# Patient Record
Sex: MALE | Race: ASIAN | NOT HISPANIC OR LATINO | Employment: FULL TIME | URBAN - NONMETROPOLITAN AREA
[De-identification: names, ages, dates, MRNs, and addresses within clinical notes are randomized per-mention and may not be internally consistent; named-entity substitution may affect disease eponyms.]

---

## 2024-05-31 ENCOUNTER — DOCUMENTATION (OUTPATIENT)
Dept: PRIMARY CARE | Facility: CLINIC | Age: 34
End: 2024-05-31

## 2024-05-31 PROCEDURE — FAA01 PR FAA1 EXAM CLASS I II III: Performed by: FAMILY MEDICINE

## 2024-06-04 ENCOUNTER — TELEPHONE (OUTPATIENT)
Dept: PRIMARY CARE | Facility: CLINIC | Age: 34
End: 2024-06-04

## 2024-06-04 NOTE — TELEPHONE ENCOUNTER
Patient states he called facility in [place] where he was instructed to go for color blind testing and they are scheduling three weeks out    He was told he only has two weeks to complete    Is asking if you have another facility he can have this done at

## 2024-06-04 NOTE — TELEPHONE ENCOUNTER
I do not  We can issue medical now with color vision restriction  And after he gets this testing done he can submit to [facility] and request they re-issue new medical without this restriction